# Patient Record
Sex: MALE | Race: OTHER | HISPANIC OR LATINO | URBAN - METROPOLITAN AREA
[De-identification: names, ages, dates, MRNs, and addresses within clinical notes are randomized per-mention and may not be internally consistent; named-entity substitution may affect disease eponyms.]

---

## 2018-03-14 ENCOUNTER — EMERGENCY (EMERGENCY)
Facility: HOSPITAL | Age: 38
LOS: 1 days | Discharge: HOME | End: 2018-03-14
Admitting: EMERGENCY MEDICINE
Payer: MEDICAID

## 2018-03-14 VITALS
OXYGEN SATURATION: 100 % | TEMPERATURE: 99 F | DIASTOLIC BLOOD PRESSURE: 87 MMHG | SYSTOLIC BLOOD PRESSURE: 126 MMHG | RESPIRATION RATE: 16 BRPM | HEART RATE: 99 BPM

## 2018-03-14 PROCEDURE — 99282 EMERGENCY DEPT VISIT SF MDM: CPT | Mod: 25

## 2018-03-14 NOTE — ED PROVIDER NOTE - OBJECTIVE STATEMENT
36 y/o male no pmh presents to ED c/o left great toe pain x 1 year - states has fungal infection to great toe nail on left foot - tried otc creams with no relief. states symptoms are slowly getting worse and sometimes hurts to walk. Denies seeing any foot doctor for this issue. denies bleeding drainage weakness.

## 2023-02-28 NOTE — ED PROVIDER NOTE - NS ED NOTE AC HIGH RISK COUNTRIES
I spoke with mother of patient she has verified name and . Mother provided me with fax number [phone] to fax over completed [facility] form. Confirmation received and a copy will be provided to mother on Friday as pt has an apt. No